# Patient Record
Sex: MALE | Race: WHITE | NOT HISPANIC OR LATINO | Employment: PART TIME | URBAN - METROPOLITAN AREA
[De-identification: names, ages, dates, MRNs, and addresses within clinical notes are randomized per-mention and may not be internally consistent; named-entity substitution may affect disease eponyms.]

---
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
## 2023-01-13 ENCOUNTER — TRANSCRIBE ORDERS (OUTPATIENT)
Dept: ADMINISTRATIVE | Facility: HOSPITAL | Age: 18
End: 2023-01-13
Payer: COMMERCIAL

## 2023-01-13 DIAGNOSIS — R07.9 CHEST PAIN, UNSPECIFIED TYPE: Primary | ICD-10-CM

## 2023-01-16 ENCOUNTER — TRANSCRIBE ORDERS (OUTPATIENT)
Dept: ADMINISTRATIVE | Facility: HOSPITAL | Age: 18
End: 2023-01-16
Payer: COMMERCIAL

## 2023-01-16 ENCOUNTER — HOSPITAL ENCOUNTER (OUTPATIENT)
Dept: CARDIOLOGY | Facility: HOSPITAL | Age: 18
Discharge: HOME | End: 2023-01-16
Admitting: GENERAL PRACTICE
Payer: COMMERCIAL

## 2023-01-16 DIAGNOSIS — R07.9 CHEST PAIN, UNSPECIFIED TYPE: ICD-10-CM

## 2023-01-16 DIAGNOSIS — R06.00 DYSPNEA, UNSPECIFIED TYPE: ICD-10-CM

## 2023-01-16 DIAGNOSIS — R07.9 CHEST PAIN, UNSPECIFIED TYPE: Primary | ICD-10-CM

## 2023-01-16 PROCEDURE — 93005 ELECTROCARDIOGRAM TRACING: CPT | Performed by: GENERAL PRACTICE

## 2023-01-16 PROCEDURE — 93010 ELECTROCARDIOGRAM REPORT: CPT | Performed by: INTERNAL MEDICINE

## 2023-01-17 LAB — QT INTERVAL: 359 MS

## 2023-02-28 ENCOUNTER — HOSPITAL ENCOUNTER (OUTPATIENT)
Dept: CARDIOLOGY | Facility: HOSPITAL | Age: 18
Discharge: HOME | End: 2023-02-28
Payer: COMMERCIAL

## 2023-02-28 DIAGNOSIS — R07.9 CHEST PAIN, UNSPECIFIED TYPE: ICD-10-CM

## 2023-02-28 PROCEDURE — 93306 TTE W/DOPPLER COMPLETE: CPT

## 2023-02-28 PROCEDURE — 93225 XTRNL ECG REC<48 HRS REC: CPT

## 2023-03-03 LAB
BH CV ECHO MEAS - AO ROOT DIAM: 2.5 CM
BH CV ECHO MEAS - EDV(MOD-SP2): 67 ML
BH CV ECHO MEAS - EDV(MOD-SP4): 69 ML
BH CV ECHO MEAS - EF(MOD-BP): 64 %
BH CV ECHO MEAS - EF(MOD-SP4): 68 %
BH CV ECHO MEAS - ESV(MOD-SP2): 26 ML
BH CV ECHO MEAS - ESV(MOD-SP4): 22 ML
BH CV ECHO MEAS - IVSD: 1 CM
BH CV ECHO MEAS - LA DIMENSION: 3.2 CM
BH CV ECHO MEAS - LAT PEAK E' VEL: 9.3 CM/SEC
BH CV ECHO MEAS - LVIDD: 3.2 CM
BH CV ECHO MEAS - LVIDS: 2.1 CM
BH CV ECHO MEAS - LVOT DIAM: 1.8 CM
BH CV ECHO MEAS - LVPWD: 0.9 CM
BH CV ECHO MEAS - MED PEAK E' VEL: 8.8 CM/SEC
BH CV ECHO MEAS - MV A MAX VEL: 45 CM/SEC
BH CV ECHO MEAS - MV DEC TIME: 94 MSEC
BH CV ECHO MEAS - MV E MAX VEL: 96 CM/SEC
BH CV ECHO MEAS - MV E/A: 2.1
BH CV ECHO MEAS - RVDD: 2.3 CM
BH CV ECHO MEASUREMENTS AVERAGE E/E' RATIO: 10.61
IVRT: 92 MSEC
LEFT ATRIUM VOLUME INDEX: 13.6 ML/M2
LEFT ATRIUM VOLUME: 26.7 ML
MAXIMAL PREDICTED HEART RATE: 201 BPM
STRESS TARGET HR: 171 BPM

## 2023-03-21 LAB
MAXIMAL PREDICTED HEART RATE: 201 BPM
STRESS TARGET HR: 171 BPM

## 2023-04-16 ENCOUNTER — APPOINTMENT (OUTPATIENT)
Dept: CT IMAGING | Facility: HOSPITAL | Age: 18
End: 2023-04-16
Payer: COMMERCIAL

## 2023-04-16 ENCOUNTER — APPOINTMENT (OUTPATIENT)
Dept: GENERAL RADIOLOGY | Facility: HOSPITAL | Age: 18
End: 2023-04-16
Payer: COMMERCIAL

## 2023-04-16 ENCOUNTER — HOSPITAL ENCOUNTER (EMERGENCY)
Facility: HOSPITAL | Age: 18
Discharge: HOME | End: 2023-04-16
Attending: EMERGENCY MEDICINE | Admitting: EMERGENCY MEDICINE
Payer: COMMERCIAL

## 2023-04-16 VITALS
RESPIRATION RATE: 16 BRPM | OXYGEN SATURATION: 96 % | TEMPERATURE: 98.5 F | HEIGHT: 69 IN | SYSTOLIC BLOOD PRESSURE: 119 MMHG | BODY MASS INDEX: 25.63 KG/M2 | DIASTOLIC BLOOD PRESSURE: 63 MMHG | WEIGHT: 173.06 LBS | HEART RATE: 93 BPM

## 2023-04-16 DIAGNOSIS — S06.0X1A CONCUSSION WITH LOSS OF CONSCIOUSNESS OF 30 MINUTES OR LESS, INITIAL ENCOUNTER: ICD-10-CM

## 2023-04-16 DIAGNOSIS — S00.432A CONTUSION OF LEFT EAR, INITIAL ENCOUNTER: ICD-10-CM

## 2023-04-16 DIAGNOSIS — S16.1XXA ACUTE STRAIN OF NECK MUSCLE, INITIAL ENCOUNTER: ICD-10-CM

## 2023-04-16 DIAGNOSIS — S00.412A ABRASION OF LEFT EAR, INITIAL ENCOUNTER: ICD-10-CM
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
## 2023-04-16 LAB — QT INTERVAL: 300 MS

## 2023-04-16 PROCEDURE — 70450 CT HEAD/BRAIN W/O DYE: CPT

## 2023-04-16 PROCEDURE — 93005 ELECTROCARDIOGRAM TRACING: CPT | Performed by: EMERGENCY MEDICINE

## 2023-04-16 PROCEDURE — 72125 CT NECK SPINE W/O DYE: CPT

## 2023-04-16 PROCEDURE — 73110 X-RAY EXAM OF WRIST: CPT

## 2023-04-16 PROCEDURE — 93005 ELECTROCARDIOGRAM TRACING: CPT

## 2023-04-16 PROCEDURE — 99283 EMERGENCY DEPT VISIT LOW MDM: CPT

## 2023-04-16 RX ORDER — CYCLOBENZAPRINE HCL 10 MG
10 TABLET ORAL 3 TIMES DAILY PRN
Qty: 15 TABLET | Refills: 0 | Status: SHIPPED | OUTPATIENT
Start: 2023-04-16
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
## 2023-04-16 NOTE — Clinical Note
[facility] EMERGENCY ROOM  [address]  [place] [address]  Phone: [phone]    King Casas was seen and treated in our emergency department on 4/16/2023.  He may return to work on 04/17/2023.         Thank you for choosing [facility].    Sukumar Stephenson, DO

## 2023-04-16 NOTE — DISCHARGE INSTRUCTIONS
Take ibuprofen 800 mg 3 times daily as needed for pain.  Take Flexeril as needed for muscle spasm or pain.  Follow wound care instructions.  Return for worsening symptoms.  Follow-up with your doctor in 2 days.

## 2023-04-25 ENCOUNTER — OFFICE VISIT (OUTPATIENT)
Dept: CARDIOLOGY | Facility: CLINIC | Age: 18
End: 2023-04-25
Payer: COMMERCIAL

## 2023-04-25 VITALS
SYSTOLIC BLOOD PRESSURE: 121 MMHG | HEART RATE: 80 BPM | BODY MASS INDEX: 26.9 KG/M2 | HEIGHT: 69 IN | DIASTOLIC BLOOD PRESSURE: 74 MMHG | WEIGHT: 181.6 LBS

## 2023-04-25 DIAGNOSIS — R93.1 ABNORMAL ECHOCARDIOGRAM: Primary | ICD-10-CM

## 2023-04-25 DIAGNOSIS — R07.9 CHEST PAIN, UNSPECIFIED TYPE: ICD-10-CM

## 2023-04-25 PROCEDURE — 99203 OFFICE O/P NEW LOW 30 MIN: CPT | Performed by: INTERNAL MEDICINE

## 2023-04-25 RX ORDER — IBUPROFEN 600 MG/1
600 TABLET ORAL 3 TIMES DAILY
COMMUNITY
Start: 2023-04-19

## 2023-04-25 NOTE — PROGRESS NOTES
"Chief Complaint  Establish Care, Abnormal Echo, b/p issues , and Coronary Artery Disease    Subjective        King Casas presents to [facility] CARDIOLOGY  History of present illness:    Patient is a 17-year-old male with no significant past medical history who presents with chest pain.  The patient states it is left-sided.  It can occur lying or sitting down.  It does hurt when he pushes on the area.  He notes exertion does not seem to to make it worse.  It will come for a while and then go away for a while.  It has been occurring for about 3 to 4 months.  He does go to the gym and lift weights regularly.  In work-up he had an echocardiogram.  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  He also had a Holter monitor that showed rare PAC and couplet.  He notes no palpitations.  He does use the vapor cigarette.  No alcohol.  Mother and father with no coronary artery disease.      History reviewed. No pertinent past medical history.      History reviewed. No pertinent surgical history.       Social History     Socioeconomic History   • Marital status: Single   Tobacco Use   • Smoking status: Never   • Smokeless tobacco: Never   Vaping Use   • Vaping Use: Every day   Substance and Sexual Activity   • Alcohol use: Not Currently   • Drug use: Never   • Sexual activity: Defer         History reviewed. No pertinent family history.       No Known Allergies         Current Outpatient Medications:   •  cyclobenzaprine (FLEXERIL) 10 MG tablet, Take 1 tablet by mouth 3 (Three) Times a Day As Needed for Muscle Spasms., Disp: 15 tablet, Rfl: 0  •  ibuprofen (ADVIL,MOTRIN) 600 MG tablet, Take 1 tablet by mouth 3 (Three) Times a Day., Disp: , Rfl:       ROS:  Cardiac review of systems positive for atypical chest pain.    Objective     /74   Pulse 80   Ht 175.3 cm (69\")   Wt 82.4 kg (181 lb 9.6 oz)   BMI 26.82 kg/m²       General Appearance:   · well developed  · well nourished  HENT: "   · oropharynx moist  · lips not cyanotic  Respiratory:  · no respiratory distress  · normal breath sounds  · no rales  Cardiovascular:  · no jugular venous distention  · regular rhythm  · S1 normal, S2 normal  · no S3, no S4   · no murmur  · no rub, no thrill  · No carotid bruit  · pedal pulses normal  · lower extremity edema: none    Musculoskeletal:  · no clubbing of fingers.   · normocephalic, head atraumatic  Skin:   · warm, dry  Psychiatric:  · judgement and insight appropriate  · normal mood and affect    ECHO:  Results for orders placed during the hospital encounter of 02/28/23    Adult Transthoracic Echo Complete W/ Cont if Necessary Per Protocol    Interpretation Summary  •  Left ventricular systolic function is normal. Calculated left ventricular EF = 64%  •  Left ventricular diastolic function was normal.    There is a mobile echogenic density suggestive of Chiari malformation persistent eustachian valve.  Consider a transesophageal echo if other pathology suspected.    STRESS:    CATH:  No results found for this or any previous visit.    BMP:   No results found for: GLUCOSE, BUN, CREATININE, NA, K, CL, CO2, CALCIUM, BCR, ANIONGAP, EGFR  LIPIDS:  No results found for: CHOL, TRIG, HDL, LDL, VLDL, LDLHDL      Procedures             ASSESSMENT:  Diagnoses and all orders for this visit:    1. Abnormal echocardiogram (Primary)    2. Chest pain, unspecified type         PLAN:    1.  I do not think patient's chest pain has a cardiac etiology.  I do think it is most consistent with costochondritis.  I did tell him if it significantly bothered him he could take a short couple days of Motrin.  2.  I reviewed patient's echocardiogram and this definitely looks like a Chiari remnant.  It comes from the IVC and attaches onto the right atrial septum.  I did tell them that this is generally considered a incidental finding with no significant problems associated with it.  3.  Discussed with the patient his Holter results  and the PACs seen.  Told him that this is also a benign finding.  He notes no palpitations.  4.  I do think we can just see the patient back as needed.      No follow-ups on file.     Patient was given instructions and counseling regarding his condition or for health maintenance advice. Please see specific information pulled into the AVS if appropriate.         Talon Yates MD   4/25/2023  09:45 EDT

## 2023-08-02 ENCOUNTER — OFFICE VISIT (OUTPATIENT)
Dept: PODIATRY | Facility: CLINIC | Age: 18
End: 2023-08-02
Payer: COMMERCIAL

## 2023-08-02 VITALS
SYSTOLIC BLOOD PRESSURE: 117 MMHG | HEIGHT: 69 IN | OXYGEN SATURATION: 97 % | BODY MASS INDEX: 25.18 KG/M2 | WEIGHT: 170 LBS | TEMPERATURE: 98.9 F | HEART RATE: 79 BPM | DIASTOLIC BLOOD PRESSURE: 75 MMHG

## 2023-08-02 DIAGNOSIS — M79.672 FOOT PAIN, LEFT: ICD-10-CM

## 2023-08-02 DIAGNOSIS — L60.0 ONYCHOCRYPTOSIS: Primary | ICD-10-CM

## 2023-08-02 DIAGNOSIS — L03.032 PARONYCHIA OF TOE OF LEFT FOOT: ICD-10-CM

## 2023-08-16 ENCOUNTER — OFFICE VISIT (OUTPATIENT)
Dept: PODIATRY | Facility: CLINIC | Age: 18
End: 2023-08-16
Payer: COMMERCIAL

## 2023-08-16 VITALS
OXYGEN SATURATION: 97 % | TEMPERATURE: 99.8 F | DIASTOLIC BLOOD PRESSURE: 55 MMHG | BODY MASS INDEX: 26.22 KG/M2 | SYSTOLIC BLOOD PRESSURE: 91 MMHG | HEIGHT: 69 IN | WEIGHT: 177 LBS | HEART RATE: 72 BPM

## 2023-08-16 DIAGNOSIS — L03.032 PARONYCHIA OF TOE OF LEFT FOOT: ICD-10-CM

## 2023-08-16 DIAGNOSIS — M79.672 FOOT PAIN, LEFT: ICD-10-CM

## 2023-08-16 DIAGNOSIS — L60.0 ONYCHOCRYPTOSIS: Primary | ICD-10-CM

## 2023-08-16 NOTE — PROGRESS NOTES
[facility]    Today's Date: 08/16/23    Patient Name: King Casas  MRN: [id number]  CSN: [id number]  PCP: Tammy Carvajal MD  Referring Provider: No ref. provider found    SUBJECTIVE     Chief Complaint   Patient presents with    Left Foot - Follow-up, Ingrown Toenail     HPI: King Casas, a 17 y.o.male, comes to clinic.    New, Established, New Problem: Established  Location: Medial and Lateral borders of left first toe  Duration:   Greater than 1 week  Onset:  Gradual  Nature:  sore with palpation.  Stable, worsening, improving:   Improving  Aggravating factors:  Pain with shoe gear and ambulation.  Previous Treatment: Chemical matrixectomy to affected nail borders    Patient denies any fevers, chills, nausea, vomiting, shortness of breath, nor any other constitutional signs nor symptoms.       Past Medical History:   Diagnosis Date    Ingrown toenail      Past Surgical History:   Procedure Laterality Date    NO PAST SURGERIES       Family History   Family history unknown: Yes     Social History     Socioeconomic History    Marital status: Single   Tobacco Use    Smoking status: Never    Smokeless tobacco: Never   Vaping Use    Vaping Use: Every day    Substances: Nicotine    Devices: Disposable   Substance and Sexual Activity    Alcohol use: Yes     Comment: social    Drug use: Never    Sexual activity: Defer     No Known Allergies  No current outpatient medications on file.     No current facility-administered medications for this visit.     Review of Systems   Constitutional: Negative.    Skin:         Follow-up for painful Left in-grown toenail   All other systems reviewed and are negative.    OBJECTIVE     Vitals:    08/16/23 1404   BP: 91/55   Pulse: 72   Temp: 99.8 øF (37.7 øC)   SpO2: 97%       PHYSICAL EXAM  GEN:      Foot/Ankle Exam    GENERAL  Appearance:  appears stated age  Orientation:  AAOx3  Affect:  appropriate  Gait:  unimpaired  Assistance:   independent  Right shoe gear: casual shoe  Left shoe gear: casual shoe    VASCULAR     Right Foot Vascularity   Normal vascular exam    Dorsalis pedis:  2+  Posterior tibial:  2+  Skin temperature:  warm  Edema grading:  None  CFT:  < 3 seconds  Pedal hair growth:  Present  Varicosities:  none     Left Foot Vascularity   Normal vascular exam    Dorsalis pedis:  2+  Posterior tibial:  2+  Skin temperature:  warm  Edema grading:  None  CFT:  < 3 seconds  Pedal hair growth:  Present  Varicosities:  none     NEUROLOGIC     Right Foot Neurologic   Normal sensation    Light touch sensation: normal  Vibratory sensation: normal  Hot/Cold sensation: normal     Left Foot Neurologic   Normal sensation    Light touch sensation: normal  Vibratory sensation: normal  Hot/Cold sensation:  normal    MUSCULOSKELETAL     Left Foot Musculoskeletal   Tenderness:  toe 1 tenderness    MUSCLE STRENGTH     Right Foot Muscle Strength   Foot dorsiflexion:  4  Foot plantar flexion:  4  Foot inversion:  4  Foot eversion:  4     Left Foot Muscle Strength   Foot dorsiflexion:  4  Foot plantar flexion:  4  Foot inversion:  4  Foot eversion:  4    RANGE OF MOTION     Right Foot Range of Motion   Foot and ankle ROM within normal limits       Left Foot Range of Motion   Foot and ankle ROM within normal limits      DERMATOLOGIC      Right Foot Dermatologic   Skin  Right foot skin is intact.      Left Foot Dermatologic   Skin  Left foot skin is intact.   Nails comment:  Left 1st bilateral nail borders  Nails  1.  Negative for ingrown toenail and paronychia. (Medial and lateral borders; healing without complications)    ASSESSMENT/PLAN     Diagnoses and all orders for this visit:    1. Onychocryptosis (Primary)    2. Paronychia of toe of left foot    3. Foot pain, left      Comprehensive lower extremity examination and evaluation was performed.    Discussed findings and treatment plan including risks, benefits, and treatment options with patient in  detail. Patient agreed with treatment plan.    Patient is to monitor for recurrence and any new symptoms and to contact Dr. Burris's office for a follow-up appointment.      The patient states understanding and agreement with this plan.  An After Visit Summary was printed and given to the patient at discharge, including (if requested) any available informative/educational handouts regarding diagnosis, treatment, or medications. All questions were answered to patient/family satisfaction. Should symptoms fail to improve or worsen they agree to call or return to clinic or to go to the Emergency Department. Discussed the importance of following up with any needed screening tests/labs/specialist appointments and any requested follow-up recommended by me today. Importance of maintaining follow-up discussed and patient accepts that missed appointments can delay diagnosis and potentially lead to worsening of conditions.    Return if symptoms worsen or fail to improve., or sooner if acute issues arise.    This document has been electronically signed by Matias Burris DPM on August 16, 2023 14:39 EDT

## 2024-08-17 ENCOUNTER — HOSPITAL ENCOUNTER (EMERGENCY)
Facility: HOSPITAL | Age: 19
Discharge: HOME | End: 2024-08-17
Attending: EMERGENCY MEDICINE
Payer: MEDICAID

## 2024-08-17 ENCOUNTER — APPOINTMENT (OUTPATIENT)
Dept: GENERAL RADIOLOGY | Facility: HOSPITAL | Age: 19
End: 2024-08-17
Payer: MEDICAID

## 2024-08-17 VITALS
HEART RATE: 104 BPM | BODY MASS INDEX: 25.28 KG/M2 | SYSTOLIC BLOOD PRESSURE: 98 MMHG | DIASTOLIC BLOOD PRESSURE: 76 MMHG | OXYGEN SATURATION: 97 % | RESPIRATION RATE: 16 BRPM | TEMPERATURE: 98 F | WEIGHT: 176.59 LBS | HEIGHT: 70 IN

## 2024-08-17 DIAGNOSIS — T78.40XA ACUTE ALLERGIC REACTION, INITIAL ENCOUNTER: Primary | ICD-10-CM

## 2024-08-17 LAB
ALBUMIN SERPL-MCNC: 4.8 G/DL (ref 3.5–5.2)
ALBUMIN/GLOB SERPL: 1.5 G/DL
ALP SERPL-CCNC: 107 U/L (ref 39–117)
ALT SERPL W P-5'-P-CCNC: 22 U/L (ref 1–41)
ANION GAP SERPL CALCULATED.3IONS-SCNC: 17.1 MMOL/L (ref 5–15)
AST SERPL-CCNC: 28 U/L (ref 1–40)
BASOPHILS # BLD AUTO: 0.02 10*3/MM3 (ref 0–0.2)
BASOPHILS NFR BLD AUTO: 0.2 % (ref 0–1.5)
BILIRUB SERPL-MCNC: 0.7 MG/DL (ref 0–1.2)
BUN SERPL-MCNC: 11 MG/DL (ref 6–20)
BUN/CREAT SERPL: 8 (ref 7–25)
CALCIUM SPEC-SCNC: 9.9 MG/DL (ref 8.6–10.5)
CHLORIDE SERPL-SCNC: 103 MMOL/L (ref 98–107)
CO2 SERPL-SCNC: 21.9 MMOL/L (ref 22–29)
CREAT SERPL-MCNC: 1.37 MG/DL (ref 0.76–1.27)
DEPRECATED RDW RBC AUTO: 37.3 FL (ref 37–54)
EGFRCR SERPLBLD CKD-EPI 2021: 75.7 ML/MIN/1.73
EOSINOPHIL # BLD AUTO: 0.2 10*3/MM3 (ref 0–0.4)
EOSINOPHIL NFR BLD AUTO: 2 % (ref 0.3–6.2)
ERYTHROCYTE [DISTWIDTH] IN BLOOD BY AUTOMATED COUNT: 12.3 % (ref 12.3–15.4)
GLOBULIN UR ELPH-MCNC: 3.1 GM/DL
GLUCOSE SERPL-MCNC: 126 MG/DL (ref 65–99)
HCT VFR BLD AUTO: 49.4 % (ref 37.5–51)
HGB BLD-MCNC: 17.3 G/DL (ref 13–17.7)
HOLD SPECIMEN: NORMAL
HOLD SPECIMEN: NORMAL
IMM GRANULOCYTES # BLD AUTO: 0.04 10*3/MM3 (ref 0–0.05)
IMM GRANULOCYTES NFR BLD AUTO: 0.4 % (ref 0–0.5)
LYMPHOCYTES # BLD AUTO: 4.51 10*3/MM3 (ref 0.7–3.1)
LYMPHOCYTES NFR BLD AUTO: 44.2 % (ref 19.6–45.3)
MCH RBC QN AUTO: 29.6 PG (ref 26.6–33)
MCHC RBC AUTO-ENTMCNC: 35 G/DL (ref 31.5–35.7)
MCV RBC AUTO: 84.4 FL (ref 79–97)
MONOCYTES # BLD AUTO: 0.57 10*3/MM3 (ref 0.1–0.9)
MONOCYTES NFR BLD AUTO: 5.6 % (ref 5–12)
NEUTROPHILS NFR BLD AUTO: 4.86 10*3/MM3 (ref 1.7–7)
NEUTROPHILS NFR BLD AUTO: 47.6 % (ref 42.7–76)
NRBC BLD AUTO-RTO: 0 /100 WBC (ref 0–0.2)
PLATELET # BLD AUTO: 308 10*3/MM3 (ref 140–450)
PMV BLD AUTO: 10.8 FL (ref 6–12)
POTASSIUM SERPL-SCNC: 3.9 MMOL/L (ref 3.5–5.2)
PROT SERPL-MCNC: 7.9 G/DL (ref 6–8.5)
RBC # BLD AUTO: 5.85 10*6/MM3 (ref 4.14–5.8)
SODIUM SERPL-SCNC: 142 MMOL/L (ref 136–145)
WBC NRBC COR # BLD AUTO: 10.2 10*3/MM3 (ref 3.4–10.8)
WHOLE BLOOD HOLD COAG: NORMAL
WHOLE BLOOD HOLD SPECIMEN: NORMAL

## 2024-08-17 PROCEDURE — 96375 TX/PRO/DX INJ NEW DRUG ADDON: CPT

## 2024-08-17 PROCEDURE — 25010000002 METHYLPREDNISOLONE PER 125 MG

## 2024-08-17 PROCEDURE — 80053 COMPREHEN METABOLIC PANEL: CPT

## 2024-08-17 PROCEDURE — 70360 X-RAY EXAM OF NECK: CPT

## 2024-08-17 PROCEDURE — 36415 COLL VENOUS BLD VENIPUNCTURE: CPT

## 2024-08-17 PROCEDURE — 85025 COMPLETE CBC W/AUTO DIFF WBC: CPT

## 2024-08-17 PROCEDURE — 96374 THER/PROPH/DIAG INJ IV PUSH: CPT

## 2024-08-17 PROCEDURE — 71046 X-RAY EXAM CHEST 2 VIEWS: CPT

## 2024-08-17 PROCEDURE — 99283 EMERGENCY DEPT VISIT LOW MDM: CPT

## 2024-08-17 RX ORDER — WATER 10 ML/10ML
INJECTION INTRAMUSCULAR; INTRAVENOUS; SUBCUTANEOUS
Status: COMPLETED
Start: 2024-08-17 | End: 2024-08-17

## 2024-08-17 RX ORDER — METHYLPREDNISOLONE SODIUM SUCCINATE 125 MG/2ML
125 INJECTION, POWDER, LYOPHILIZED, FOR SOLUTION INTRAMUSCULAR; INTRAVENOUS ONCE
Status: COMPLETED | OUTPATIENT
Start: 2024-08-17 | End: 2024-08-17

## 2024-08-17 RX ORDER — FAMOTIDINE 10 MG/ML
20 INJECTION, SOLUTION INTRAVENOUS ONCE
Status: COMPLETED | OUTPATIENT
Start: 2024-08-17 | End: 2024-08-17

## 2024-08-17 RX ORDER — SODIUM CHLORIDE 0.9 % (FLUSH) 0.9 %
10 SYRINGE (ML) INJECTION AS NEEDED
Status: DISCONTINUED | OUTPATIENT
Start: 2024-08-17 | End: 2024-08-17 | Stop reason: HOSPADM

## 2024-08-17 RX ORDER — FAMOTIDINE 20 MG/1
20 TABLET, FILM COATED ORAL 2 TIMES DAILY
Qty: 20 TABLET | Refills: 0 | Status: SHIPPED | OUTPATIENT
Start: 2024-08-17

## 2024-08-17 RX ORDER — PREDNISONE 20 MG/1
TABLET ORAL
Qty: 15 TABLET | Refills: 0 | Status: SHIPPED | OUTPATIENT
Start: 2024-08-17

## 2024-08-17 RX ORDER — CETIRIZINE HYDROCHLORIDE 10 MG/1
10 TABLET ORAL DAILY
Status: DISCONTINUED | OUTPATIENT
Start: 2024-08-17 | End: 2024-08-17 | Stop reason: HOSPADM

## 2024-08-17 RX ADMIN — FAMOTIDINE 20 MG: 10 INJECTION INTRAVENOUS at 19:03

## 2024-08-17 RX ADMIN — CETIRIZINE HYDROCHLORIDE 10 MG: 10 TABLET, FILM COATED ORAL at 19:46

## 2024-08-17 RX ADMIN — METHYLPREDNISOLONE SODIUM SUCCINATE 125 MG: 125 INJECTION INTRAMUSCULAR; INTRAVENOUS at 19:03

## 2024-08-17 RX ADMIN — WATER 10 ML: 1 INJECTION INTRAMUSCULAR; INTRAVENOUS; SUBCUTANEOUS at 19:03

## 2024-08-17 NOTE — ED PROVIDER NOTES
Time: 6:52 PM EDT  Date of encounter:  8/17/2024  Independent Historian/Clinical History and Information was obtained by:   Patient    History is limited by: N/A    Chief Complaint   Patient presents with    Allergic Reaction         History of Present Illness:  Patient is a 18 y.o. year old male who presents to the emergency department for evaluation of allergic reaction.  Patient states that he possibly has an allergy to shellfish and he ate shrimp today.  Patient states that he developed a rash fairly quickly, syncope, shaking and shortness of breath.  Patient states that he ate the shrimp about 1 hour ago.  Patient took 4 Benadryl tablets at home prior to coming to the ER.  Bailey Seaver, APRN, MICHAELC    Patient Care Team  Primary Care Provider: Tammy Carvajal MD    Past Medical History:     No Known Allergies  Past Medical History:   Diagnosis Date    Ingrown toenail      Past Surgical History:   Procedure Laterality Date    NO PAST SURGERIES       Family History   Problem Relation Age of Onset    No Known Problems Mother     No Known Problems Father     No Known Problems Sister     No Known Problems Brother     No Known Problems Son     No Known Problems Daughter     No Known Problems Maternal Grandmother     No Known Problems Maternal Grandfather     No Known Problems Paternal Grandmother     No Known Problems Paternal Grandfather     No Known Problems Cousin     No Known Problems Other     Rheum arthritis Neg Hx     Osteoarthritis Neg Hx     Asthma Neg Hx     Diabetes Neg Hx     Heart failure Neg Hx     Hyperlipidemia Neg Hx     Hypertension Neg Hx     Migraines Neg Hx     Rashes / Skin problems Neg Hx     Seizures Neg Hx     Stroke Neg Hx     Thyroid disease Neg Hx        Home Medications:  Prior to Admission medications    Medication Sig Start Date End Date Taking? Authorizing Provider   fluticasone (FLONASE) 50 MCG/ACT nasal spray 2 sprays into the nostril(s) as directed by provider Daily for 31 days.  "11/18/23 12/19/23  Cooksey, John Calvin, PA-C        Social History:   Social History     Tobacco Use    Smoking status: Never    Smokeless tobacco: Never   Vaping Use    Vaping status: Every Day    Substances: Nicotine    Devices: Disposable   Substance Use Topics    Alcohol use: Yes     Comment: social    Drug use: Never         Review of Systems:  Review of Systems   Constitutional:  Negative for chills and fever.   HENT:  Negative for congestion, ear pain and sore throat.    Eyes:  Negative for pain.   Respiratory:  Positive for shortness of breath. Negative for cough and chest tightness.    Cardiovascular:  Negative for chest pain.   Gastrointestinal:  Negative for abdominal pain, diarrhea, nausea and vomiting.   Genitourinary:  Negative for flank pain and hematuria.   Musculoskeletal:  Negative for joint swelling.   Skin:  Positive for rash. Negative for pallor.   Neurological:  Positive for tremors, syncope and headaches. Negative for seizures.   All other systems reviewed and are negative.       Physical Exam:  BP 98/76 (BP Location: Right arm, Patient Position: Lying)   Pulse 104   Temp 98 °F (36.7 °C) (Oral)   Resp 16   Ht 177.8 cm (70\")   Wt 80.1 kg (176 lb 9.4 oz)   SpO2 97%   BMI 25.34 kg/m²         Physical Exam  Vitals and nursing note reviewed.   Constitutional:       General: He is not in acute distress.     Appearance: Normal appearance. He is not toxic-appearing.   HENT:      Head: Normocephalic and atraumatic.      Mouth/Throat:      Mouth: Mucous membranes are moist.   Eyes:      General: No scleral icterus.  Cardiovascular:      Rate and Rhythm: Normal rate and regular rhythm.      Pulses: Normal pulses.      Heart sounds: Normal heart sounds.   Pulmonary:      Effort: Pulmonary effort is normal. No respiratory distress.      Breath sounds: Normal breath sounds.   Abdominal:      General: Abdomen is flat.      Palpations: Abdomen is soft.      Tenderness: There is no abdominal " tenderness.   Musculoskeletal:         General: Normal range of motion.      Cervical back: Normal range of motion and neck supple.   Skin:     General: Skin is warm and dry.   Neurological:      Mental Status: He is alert and oriented to person, place, and time. Mental status is at baseline.                      Procedures:  Procedures      Medical Decision Making:      Comorbidities that affect care:    None    External Notes reviewed:    Previous Clinic Note: Urgent care visit for pharyngitis.      The following orders were placed and all results were independently analyzed by me:  Orders Placed This Encounter   Procedures    XR Chest 2 View    XR Neck Soft Tissue    Jackson Draw    Comprehensive Metabolic Panel    CBC Auto Differential    Continuous Pulse Oximetry    Vital Signs    CBC & Differential    Green Top (Gel)    Lavender Top    Gold Top - SST    Light Blue Top       Medications Given in the Emergency Department:  Medications   famotidine (PEPCID) injection 20 mg (20 mg Intravenous Given 8/17/24 1903)   methylPREDNISolone sodium succinate (SOLU-Medrol) injection 125 mg (125 mg Intravenous Given 8/17/24 1903)   sterile water (preservative free) injection  - ADS Override Pull (10 mL  Given 8/17/24 1903)        ED Course:    The patient was initially evaluated in the triage area where orders were placed. The patient was later dispositioned by Sukumar Stephenson DO.      The patient was advised to stay for completion of workup which includes but is not limited to communication of labs and radiological results, reassessment and plan. The patient was advised that leaving prior to disposition by a provider could result in critical findings that are not communicated to the patient.     ED Course as of 08/19/24 1616   Sat Aug 17, 2024   1855 PROVIDER IN TRIAGE  Patient was evaluated by me in triage, Bailey Seaver, APRN, JENNYFER-MIGUEL.  Orders were placed and patient is currently awaiting final results and disposition.  [AS]      ED Course User Index  [AS] Seaver, Alyce B, JODEE       Labs:    Results for orders placed or performed during the hospital encounter of 08/17/24   Comprehensive Metabolic Panel    Specimen: Blood   Result Value Ref Range    Glucose 126 (H) 65 - 99 mg/dL    BUN 11 6 - 20 mg/dL    Creatinine 1.37 (H) 0.76 - 1.27 mg/dL    Sodium 142 136 - 145 mmol/L    Potassium 3.9 3.5 - 5.2 mmol/L    Chloride 103 98 - 107 mmol/L    CO2 21.9 (L) 22.0 - 29.0 mmol/L    Calcium 9.9 8.6 - 10.5 mg/dL    Total Protein 7.9 6.0 - 8.5 g/dL    Albumin 4.8 3.5 - 5.2 g/dL    ALT (SGPT) 22 1 - 41 U/L    AST (SGOT) 28 1 - 40 U/L    Alkaline Phosphatase 107 39 - 117 U/L    Total Bilirubin 0.7 0.0 - 1.2 mg/dL    Globulin 3.1 gm/dL    A/G Ratio 1.5 g/dL    BUN/Creatinine Ratio 8.0 7.0 - 25.0    Anion Gap 17.1 (H) 5.0 - 15.0 mmol/L    eGFR 75.7 >60.0 mL/min/1.73   CBC Auto Differential    Specimen: Blood   Result Value Ref Range    WBC 10.20 3.40 - 10.80 10*3/mm3    RBC 5.85 (H) 4.14 - 5.80 10*6/mm3    Hemoglobin 17.3 13.0 - 17.7 g/dL    Hematocrit 49.4 37.5 - 51.0 %    MCV 84.4 79.0 - 97.0 fL    MCH 29.6 26.6 - 33.0 pg    MCHC 35.0 31.5 - 35.7 g/dL    RDW 12.3 12.3 - 15.4 %    RDW-SD 37.3 37.0 - 54.0 fl    MPV 10.8 6.0 - 12.0 fL    Platelets 308 140 - 450 10*3/mm3    Neutrophil % 47.6 42.7 - 76.0 %    Lymphocyte % 44.2 19.6 - 45.3 %    Monocyte % 5.6 5.0 - 12.0 %    Eosinophil % 2.0 0.3 - 6.2 %    Basophil % 0.2 0.0 - 1.5 %    Immature Grans % 0.4 0.0 - 0.5 %    Neutrophils, Absolute 4.86 1.70 - 7.00 10*3/mm3    Lymphocytes, Absolute 4.51 (H) 0.70 - 3.10 10*3/mm3    Monocytes, Absolute 0.57 0.10 - 0.90 10*3/mm3    Eosinophils, Absolute 0.20 0.00 - 0.40 10*3/mm3    Basophils, Absolute 0.02 0.00 - 0.20 10*3/mm3    Immature Grans, Absolute 0.04 0.00 - 0.05 10*3/mm3    nRBC 0.0 0.0 - 0.2 /100 WBC   Green Top (Gel)   Result Value Ref Range    Extra Tube Hold for add-ons.    Lavender Top   Result Value Ref Range    Extra Tube hold for add-on     Gold Top - SST   Result Value Ref Range    Extra Tube Hold for add-ons.    Light Blue Top   Result Value Ref Range    Extra Tube Hold for add-ons.          Lab Results (last 24 hours)       ** No results found for the last 24 hours. **             Imaging:    No Radiology Exams Resulted Within Past 24 Hours      Differential Diagnosis and Discussion:      Allergic Reaction: Differential diagnosis includes but is not limited to drug side effects, contact dermatitis, autoimmune conditions, infections, mast cell disorders, serum sickness, anaphylactoid reactions, angioedema, panic or anxiety attacks.    All labs were reviewed and interpreted by me.  All X-rays impressions were independently interpreted by me.    MDM     Amount and/or Complexity of Data Reviewed  Clinical lab tests: reviewed  Tests in the radiology section of CPT®: reviewed                 Patient Care Considerations:    CT CHEST: I considered ordering a CT scan of the chest, however patient has no signs of pulmonary embolism.      Consultants/Shared Management Plan:    None    Social Determinants of Health:    Patient has presented with family members who are responsible, reliable and will ensure follow up care.      Disposition and Care Coordination:    Discharged: I considered escalation of care by admitting this patient to the hospital, however patient's symptoms are completely resolved and his vital signs are stable.    I have explained discharge medications and the need for follow up with the patient/caretakers. This was also printed in the discharge instructions. Patient was discharged with the following medications and follow up:      Medication List        New Prescriptions      famotidine 20 MG tablet  Commonly known as: PEPCID  Take 1 tablet by mouth 2 (Two) Times a Day.     predniSONE 20 MG tablet  Commonly known as: DELTASONE  Take 3 p.o. daily for 5 days.               Where to Get Your Medications        These medications were sent to  [id number] - [place], [place] - [address] - [phone]  - [phone] FX  [address] [place] [address]      Hours: 24-hours Phone: [phone]   famotidine 20 MG tablet  predniSONE 20 MG tablet      Tammy Carvajal MD  [address] [place], [place] [id number] [phone]    In 2 days  For referral to allergist.    Arnav Gruber MD  [address] [place] [address], [address] [place], [place] [id number] [phone]    In 2 days  Call for appointment       Final diagnoses:   Acute allergic reaction, initial encounter        ED Disposition       ED Disposition   Discharge    Condition   Stable    Comment   --               This medical record created using voice recognition software.             Sukumar Stephenson,   08/19/24 1616       Sukumar Stephenson,   08/19/24 161

## 2024-08-18 NOTE — DISCHARGE INSTRUCTIONS
Take medications as directed.  Avoid shellfish or seafood until you are cleared by your physician and/or allergist.  You can take over-the-counter Benadryl as directed.  Return for worsening symptoms.  Follow-up with your doctor on Monday.